# Patient Record
Sex: FEMALE | NOT HISPANIC OR LATINO | Employment: STUDENT | URBAN - METROPOLITAN AREA
[De-identification: names, ages, dates, MRNs, and addresses within clinical notes are randomized per-mention and may not be internally consistent; named-entity substitution may affect disease eponyms.]

---

## 2018-05-23 ENCOUNTER — OFFICE VISIT (OUTPATIENT)
Dept: INTERNAL MEDICINE | Facility: CLINIC | Age: 28
End: 2018-05-23

## 2018-05-23 VITALS
SYSTOLIC BLOOD PRESSURE: 104 MMHG | DIASTOLIC BLOOD PRESSURE: 64 MMHG | HEART RATE: 106 BPM | OXYGEN SATURATION: 99 % | HEIGHT: 63 IN | BODY MASS INDEX: 28 KG/M2 | WEIGHT: 158 LBS

## 2018-05-23 DIAGNOSIS — Z00.00 HEALTHCARE MAINTENANCE: Primary | ICD-10-CM

## 2018-05-23 DIAGNOSIS — Z11.1 SCREENING-PULMONARY TB: ICD-10-CM

## 2018-05-23 LAB
INDURATION: NORMAL MM (ref 0–10)
TB SKIN TEST: NORMAL

## 2018-05-23 PROCEDURE — 86580 TB INTRADERMAL TEST: CPT | Performed by: NURSE PRACTITIONER

## 2018-05-23 NOTE — PATIENT INSTRUCTIONS
Tuberculin Skin Test  Why am I having this test?  Tuberculosis (TB) is a bacterial infection caused by Mycobacterium tuberculosis. Most people who are exposed to these bacteria have a strong enough defense (immune) system to prevent the bacteria from causing TB and developing symptoms. Their bodies prevent the germs from being active and making them sick (latent TB infection).  However, if you have TB germs in your body and your immune system is weak, you can develop a TB infection. This can cause symptoms such as:  · Night sweats.  · Fever.  · Weakness.  · Weight loss.  A latent TB infection can also become active later in life if your immune system becomes weakened or compromised.  You may have this test if your health care provider suspects that you have TB. You may also have this test to screen for TB if you are at risk for getting the disease. Those at increased risk include:  · People who inject illegal drugs or share needles.  · People with HIV or other diseases that affect immunity.  · Health care workers.  · People who live in high-risk communities, such as homeless shelters, nursing homes, and correctional facilities.  · People who have been in contact with someone with TB.  · People from countries where TB is more common.  If you are in a high-risk group, your health care provider may wish to screen for TB more often. This can help prevent the spread of the disease. Sometimes TB screening is required when starting a new job, such as becoming a health care worker or a teacher. Colleges or universities may require it of new students.  What is being tested?  A tuberculin skin test is the main test used to check for exposure to the bacteria that can cause TB. The test checks for antibodies to the bacteria. Antibodies are proteins that your body produces to protect you from germs and other things that can make you sick.  Your health care provider will inject a solution known as PPD (purified protein  derivative) under the first layer of skin on your arm. This causes a blister-like bubble to form at the site. Your health care provider will then examine the site after a number of hours have passed to see if a reaction has occurred.  How do I prepare for this test?  There is no preparation required for this test.  What do the results mean?  Your test results will be reported as either negative or positive.  If the tuberculin skin test produces a negative result, it is likely that you do not have TB and have not been exposed to the TB bacteria.  If you or your health care provider suspects exposure, however, you may want to repeat the test a few weeks later. A blood test may also be used to check for TB. This is because you will not react to the tuberculin skin test until several weeks after exposure to TB bacteria.  If you test positive to the tuberculin skin test, it is likely that you have been exposed to TB bacteria. The test does not distinguish between an active and a latent TB infection.  A false-positive result can occur. A false-positive result for TB bacteria is incorrect because it indicates a condition or finding is present when it is not.  Talk to your health care provider to discuss your results, treatment options, and if necessary, the need for more tests.  It is your responsibility to obtain your test results. Ask the lab or department performing the test when and how you will get your results. Talk with your health care provider if you have any questions about your results.  Talk with your health care provider to discuss your results, treatment options, and if necessary, the need for more tests. Talk with your health care provider if you have any questions about your results.  This information is not intended to replace advice given to you by your health care provider. Make sure you discuss any questions you have with your health care provider.  Document Released: 09/27/2006 Document Revised:  08/20/2017 Document Reviewed: 04/13/2015  [employer] Interactive Patient Education © 2017 [employer]    Return to the clinic in 48-72 hours for reading.  Good luck with your clinicals

## 2018-05-23 NOTE — PROGRESS NOTES
"Subjective   Jennifer Patricio is a 28 y.o. female.   Chief Complaint   Patient presents with   • Establish Care      History of Present Illness patient reports she is here for a PPD.  She is beginning [facility] [employer] clinicals in [place] in August 2018.  She reports the school requires her to have 2 PPDs.  She is re: had the first one which she reports was negative.  She denies any health care concerns.  Denies daily headaches, ear pain, sinus drainage, sorethroat shortness of air, cough, chest pain, abdominal pain, nausea vomiting diarrhea, dysuria.  Reports her menstrual cycles are regular.  Denies sexual activity.  No night sweats, unexplained weight loss, unexplained fever chills.  No known history of exposure to TB    The following portions of the patient's history were reviewed and updated as appropriate: allergies, current medications, past family history, past medical history, past social history, past surgical history and problem list.  No current outpatient prescriptions on file.    Review of Systems.  Constitutional, HEENT, respiratory, cardiovascular, GI, , musculoskeletal, endocrine, neuro mental, skin, hematological were reviewed.  Review of systems was negative.  /64   Pulse 106   Ht 160 cm (63\")   Wt 71.7 kg (158 lb)   SpO2 99%   BMI 27.99 kg/m²     Objective   No Known Allergies    Physical Exam   Constitutional: She is oriented to person, place, and time. She appears well-developed and well-nourished. No distress.   Neck: Neck supple.   Cardiovascular: Normal rate, regular rhythm, normal heart sounds and intact distal pulses.    Pulmonary/Chest: Effort normal and breath sounds normal.   Lymphadenopathy:     She has no cervical adenopathy.   Neurological: She is alert and oriented to person, place, and time.   Skin: Skin is warm and dry. Capillary refill takes less than 2 seconds. No rash noted.   Color pink, no rash   Nursing note and vitals reviewed.      Procedures    Assessment/Plan "   Jennifer was seen today for establish care.    Diagnoses and all orders for this visit:    Healthcare maintenance  -     TB Skin Test    Screening-pulmonary TB          Patient Instructions   Tuberculin Skin Test  Why am I having this test?  Tuberculosis (TB) is a bacterial infection caused by Mycobacterium tuberculosis. Most people who are exposed to these bacteria have a strong enough defense (immune) system to prevent the bacteria from causing TB and developing symptoms. Their bodies prevent the germs from being active and making them sick (latent TB infection).  However, if you have TB germs in your body and your immune system is weak, you can develop a TB infection. This can cause symptoms such as:  · Night sweats.  · Fever.  · Weakness.  · Weight loss.  A latent TB infection can also become active later in life if your immune system becomes weakened or compromised.  You may have this test if your health care provider suspects that you have TB. You may also have this test to screen for TB if you are at risk for getting the disease. Those at increased risk include:  · People who inject illegal drugs or share needles.  · People with HIV or other diseases that affect immunity.  · Health care workers.  · People who live in high-risk communities, such as homeless shelters, nursing homes, and correctional facilities.  · People who have been in contact with someone with TB.  · People from countries where TB is more common.  If you are in a high-risk group, your health care provider may wish to screen for TB more often. This can help prevent the spread of the disease. Sometimes TB screening is required when starting a new job, such as becoming a health care worker or a teacher. Colleges or universities may require it of new students.  What is being tested?  A tuberculin skin test is the main test used to check for exposure to the bacteria that can cause TB. The test checks for antibodies to the bacteria. Antibodies  are proteins that your body produces to protect you from germs and other things that can make you sick.  Your health care provider will inject a solution known as PPD (purified protein derivative) under the first layer of skin on your arm. This causes a blister-like bubble to form at the site. Your health care provider will then examine the site after a number of hours have passed to see if a reaction has occurred.  How do I prepare for this test?  There is no preparation required for this test.  What do the results mean?  Your test results will be reported as either negative or positive.  If the tuberculin skin test produces a negative result, it is likely that you do not have TB and have not been exposed to the TB bacteria.  If you or your health care provider suspects exposure, however, you may want to repeat the test a few weeks later. A blood test may also be used to check for TB. This is because you will not react to the tuberculin skin test until several weeks after exposure to TB bacteria.  If you test positive to the tuberculin skin test, it is likely that you have been exposed to TB bacteria. The test does not distinguish between an active and a latent TB infection.  A false-positive result can occur. A false-positive result for TB bacteria is incorrect because it indicates a condition or finding is present when it is not.  Talk to your health care provider to discuss your results, treatment options, and if necessary, the need for more tests.  It is your responsibility to obtain your test results. Ask the lab or department performing the test when and how you will get your results. Talk with your health care provider if you have any questions about your results.  Talk with your health care provider to discuss your results, treatment options, and if necessary, the need for more tests. Talk with your health care provider if you have any questions about your results.  This information is not intended to  replace advice given to you by your health care provider. Make sure you discuss any questions you have with your health care provider.  Document Released: 09/27/2006 Document Revised: 08/20/2017 Document Reviewed: 04/13/2015  [employer] Interactive Patient Education © 2017 [employer]    Return to the clinic in 48-72 hours for reading.  Good luck with your clinicals      KIANA Doran

## 2018-06-13 ENCOUNTER — TELEPHONE (OUTPATIENT)
Dept: INTERNAL MEDICINE | Facility: CLINIC | Age: 28
End: 2018-06-13

## 2018-06-13 DIAGNOSIS — Z00.00 HEALTHCARE MAINTENANCE: Primary | ICD-10-CM

## 2018-06-13 NOTE — TELEPHONE ENCOUNTER
PATIENT WOULD LIKE TO KNOW IF SHE CAN GET A LAB ORDER PUT IN TO GET TIDERS DONE. SHE NEEDS TO GET CHECKED FOR IMMUNITY ON [facility]

## 2018-06-14 ENCOUNTER — LAB (OUTPATIENT)
Dept: INTERNAL MEDICINE | Facility: CLINIC | Age: 28
End: 2018-06-14

## 2018-06-14 DIAGNOSIS — Z00.00 HEALTHCARE MAINTENANCE: ICD-10-CM

## 2018-06-14 PROCEDURE — 86787 VARICELLA-ZOSTER ANTIBODY: CPT | Performed by: NURSE PRACTITIONER

## 2018-06-16 LAB — VZV IGG SER IA-ACNC: 603 INDEX

## 2018-06-18 ENCOUNTER — TELEPHONE (OUTPATIENT)
Dept: INTERNAL MEDICINE | Facility: CLINIC | Age: 28
End: 2018-06-18

## 2018-06-18 NOTE — TELEPHONE ENCOUNTER
PATIENT WAS RETURNING A CALL SHE RECEIVED FROM OUR OFFICE AND WOULD LIKE TO GET A CALL BACK [phone]

## 2018-07-18 ENCOUNTER — OFFICE VISIT (OUTPATIENT)
Dept: INTERNAL MEDICINE | Facility: CLINIC | Age: 28
End: 2018-07-18

## 2018-07-18 VITALS
HEIGHT: 63 IN | HEART RATE: 98 BPM | BODY MASS INDEX: 28.17 KG/M2 | WEIGHT: 159 LBS | SYSTOLIC BLOOD PRESSURE: 106 MMHG | OXYGEN SATURATION: 99 % | DIASTOLIC BLOOD PRESSURE: 62 MMHG

## 2018-07-18 DIAGNOSIS — R63.5 WEIGHT GAIN: Primary | ICD-10-CM

## 2018-07-18 LAB
ALBUMIN SERPL-MCNC: 4.31 G/DL (ref 3.2–4.8)
ALBUMIN/GLOB SERPL: 1.5 G/DL (ref 1.5–2.5)
ALP SERPL-CCNC: 77 U/L (ref 25–100)
ALT SERPL W P-5'-P-CCNC: 18 U/L (ref 7–40)
ANION GAP SERPL CALCULATED.3IONS-SCNC: 9 MMOL/L (ref 3–11)
AST SERPL-CCNC: 22 U/L (ref 0–33)
BILIRUB SERPL-MCNC: 0.2 MG/DL (ref 0.3–1.2)
BUN BLD-MCNC: 13 MG/DL (ref 9–23)
BUN/CREAT SERPL: 18.8 (ref 7–25)
CALCIUM SPEC-SCNC: 9.2 MG/DL (ref 8.7–10.4)
CHLORIDE SERPL-SCNC: 105 MMOL/L (ref 99–109)
CO2 SERPL-SCNC: 26 MMOL/L (ref 20–31)
CREAT BLD-MCNC: 0.69 MG/DL (ref 0.6–1.3)
GFR SERPL CREATININE-BSD FRML MDRD: 102 ML/MIN/1.73
GFR SERPL CREATININE-BSD FRML MDRD: 124 ML/MIN/1.73
GLOBULIN UR ELPH-MCNC: 2.8 GM/DL
GLUCOSE BLD-MCNC: 108 MG/DL (ref 70–100)
POTASSIUM BLD-SCNC: 4.3 MMOL/L (ref 3.5–5.5)
PROT SERPL-MCNC: 7.1 G/DL (ref 5.7–8.2)
SODIUM BLD-SCNC: 140 MMOL/L (ref 132–146)
T4 FREE SERPL-MCNC: 1 NG/DL (ref 0.89–1.76)
TSH SERPL DL<=0.05 MIU/L-ACNC: 1.19 MIU/ML (ref 0.35–5.35)

## 2018-07-18 PROCEDURE — 99213 OFFICE O/P EST LOW 20 MIN: CPT | Performed by: NURSE PRACTITIONER

## 2018-07-18 PROCEDURE — 84439 ASSAY OF FREE THYROXINE: CPT | Performed by: NURSE PRACTITIONER

## 2018-07-18 PROCEDURE — 80053 COMPREHEN METABOLIC PANEL: CPT | Performed by: NURSE PRACTITIONER

## 2018-07-18 PROCEDURE — 84443 ASSAY THYROID STIM HORMONE: CPT | Performed by: NURSE PRACTITIONER

## 2018-07-18 PROCEDURE — 36415 COLL VENOUS BLD VENIPUNCTURE: CPT | Performed by: NURSE PRACTITIONER

## 2018-07-18 NOTE — PROGRESS NOTES
"Subjective   Jennifer Patricio is a 28 y.o. female.   Chief Complaint   Patient presents with   • Annual Exam     does not need pap or breast exam.    • Weight Gain   • hair on chin      History of Present Illness As above  Onset 2 years ago.  Has regular periods.  She is concerned she could have hypothyroidism as her mother was just diagnosed with this condition.  Patient denies daily headaches, ear pain, sore throat, shortness of air, chest pain, abdominal pain, nausea vomiting or diarrhea. She tries to watch her diet and exercise to help keep her weight under control    The following portions of the patient's history were reviewed and updated as appropriate: allergies, current medications, past family history, past medical history, past social history, past surgical history and problem list.  No current outpatient prescriptions on file.    Review of Systems Consitutional, HEENT, Respiratory, CV, GI, , Skin, Musculoskeletal, Neuro-mental, Endocrinological, Hematological were reviewed.  Positives were discussed in the HPI, otherwise ROS was negative   /62   Pulse 98   Ht 160 cm (63\")   Wt 72.1 kg (159 lb)   SpO2 99%   BMI 28.17 kg/m²     Objective   No Known Allergies    Physical Exam   Constitutional: She is oriented to person, place, and time. She appears well-developed and well-nourished. No distress.   HENT:   Head: Normocephalic.   Right Ear: External ear normal.   Left Ear: External ear normal.   Nose: Nose normal.   Mouth/Throat: Oropharynx is clear and moist.   Eyes: Right eye exhibits no discharge. Left eye exhibits no discharge.   Neck: Neck supple. No thyromegaly present.   Cardiovascular: Normal rate, regular rhythm, normal heart sounds and intact distal pulses.  Exam reveals no gallop and no friction rub.    No murmur heard.  Pulmonary/Chest: Effort normal and breath sounds normal. No respiratory distress.   Abdominal: Soft. Bowel sounds are normal. There is no tenderness. "   Neurological: She is alert and oriented to person, place, and time.   Skin: Skin is warm and dry.   Pink, no rash.  Minimal chin hair   Nursing note and vitals reviewed.      Procedures    Assessment/Plan   Jennifer was seen today for annual exam, weight gain and hair on chin.    Diagnoses and all orders for this visit:    Weight gain  -     Comprehensive metabolic panel  -     TSH  -     T4, free; Future  -     T4, free        Patient Instructions   Good luck with school.  Laboratory studies as discussed.  Continue diet and exercise.        KIANA Doran

## 2020-02-18 ENCOUNTER — TELEPHONE (OUTPATIENT)
Dept: INTERNAL MEDICINE | Facility: CLINIC | Age: 30
End: 2020-02-18